# Patient Record
Sex: FEMALE | Race: OTHER | HISPANIC OR LATINO | Employment: UNEMPLOYED | URBAN - METROPOLITAN AREA
[De-identification: names, ages, dates, MRNs, and addresses within clinical notes are randomized per-mention and may not be internally consistent; named-entity substitution may affect disease eponyms.]

---

## 2018-09-10 ENCOUNTER — HOSPITAL ENCOUNTER (EMERGENCY)
Facility: HOSPITAL | Age: 23
Discharge: HOME | End: 2018-09-11
Attending: EMERGENCY MEDICINE | Admitting: EMERGENCY MEDICINE
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
## 2018-09-10 DIAGNOSIS — F32.A DEPRESSION: Primary | ICD-10-CM

## 2018-09-10 LAB — ETHANOL EXG-MCNC: 0 MG/DL

## 2018-09-10 PROCEDURE — 82075 ASSAY OF BREATH ETHANOL: CPT | Performed by: EMERGENCY MEDICINE

## 2018-09-10 PROCEDURE — 81025 URINE PREGNANCY TEST: CPT | Performed by: EMERGENCY MEDICINE

## 2018-09-11 VITALS
RESPIRATION RATE: 12 BRPM | DIASTOLIC BLOOD PRESSURE: 79 MMHG | HEART RATE: 55 BPM | WEIGHT: 166.23 LBS | TEMPERATURE: 98.9 F | SYSTOLIC BLOOD PRESSURE: 114 MMHG | OXYGEN SATURATION: 100 %

## 2018-09-11 LAB
AMPHETAMINES SERPL QL SCN: NEGATIVE
BARBITURATES UR QL: NEGATIVE
BENZODIAZ UR QL: NEGATIVE
COCAINE UR QL: NEGATIVE
EXT PREG TEST URINE: NEGATIVE
METHADONE UR QL: NEGATIVE
OPIATES UR QL SCN: NEGATIVE
PCP UR QL: NEGATIVE
THC UR QL: POSITIVE

## 2018-09-11 PROCEDURE — 80307 DRUG TEST PRSMV CHEM ANLYZR: CPT | Performed by: EMERGENCY MEDICINE

## 2018-09-11 PROCEDURE — 99284 EMERGENCY DEPT VISIT MOD MDM: CPT
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
## 2018-09-11 NOTE — ED NOTES
Crisis worker, Secundino Sicard, just completed evaluating pt  Out patient references given to pt at this time by crisis  Dr Kiley Olmedo made aware        Ashleigh Jin, ZURDO  09/11/18 2654
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
## 2018-09-11 NOTE — ED NOTES
Called received from Bo Whitehead, from crisis  Will be over to unit after he is finished with consult at [facility]        Matty Oneill RN  09/10/18 3067